# Patient Record
Sex: MALE | Race: WHITE | Employment: FULL TIME | URBAN - METROPOLITAN AREA
[De-identification: names, ages, dates, MRNs, and addresses within clinical notes are randomized per-mention and may not be internally consistent; named-entity substitution may affect disease eponyms.]

---

## 2023-07-25 ENCOUNTER — HOSPITAL ENCOUNTER (EMERGENCY)
Facility: HOSPITAL | Age: 33
Discharge: HOME | End: 2023-07-25
Attending: EMERGENCY MEDICINE
Payer: COMMERCIAL

## 2023-07-25 VITALS
WEIGHT: 200 LBS | OXYGEN SATURATION: 99 % | TEMPERATURE: 98 F | SYSTOLIC BLOOD PRESSURE: 133 MMHG | DIASTOLIC BLOOD PRESSURE: 87 MMHG | HEART RATE: 71 BPM | BODY MASS INDEX: 27.09 KG/M2 | HEIGHT: 72 IN | RESPIRATION RATE: 18 BRPM

## 2023-07-25 DIAGNOSIS — S00.432A HEMATOMA OF LEFT AURICULAR REGION: Primary | ICD-10-CM

## 2023-07-25 PROCEDURE — 99282 EMERGENCY DEPT VISIT SF MDM: CPT

## 2023-07-25 NOTE — ED INITIAL ASSESSMENT (HPI)
26 y/o male here for eval of hematoma to L ear which he has had for 3 weeks, it was drained last week at an UC and he was told to go to the ER if it came back to see an ENT. Pt reports pain, no itching/irritation, fevers/chills.

## 2023-07-26 NOTE — ED QUICK NOTES
Patient safe to discharge home per ED Provider. Discharge education provided, including follow up instructions. Patient verbalizes understanding.